# Patient Record
Sex: MALE | Race: WHITE | URBAN - METROPOLITAN AREA
[De-identification: names, ages, dates, MRNs, and addresses within clinical notes are randomized per-mention and may not be internally consistent; named-entity substitution may affect disease eponyms.]

---

## 2018-01-08 ENCOUNTER — THERAPY VISIT (OUTPATIENT)
Dept: PHYSICAL THERAPY | Facility: CLINIC | Age: 23
End: 2018-01-08
Payer: COMMERCIAL

## 2018-01-08 DIAGNOSIS — M79.671 RIGHT FOOT PAIN: Primary | ICD-10-CM

## 2018-01-08 PROCEDURE — 97110 THERAPEUTIC EXERCISES: CPT | Mod: GP | Performed by: PHYSICAL THERAPIST

## 2018-01-08 PROCEDURE — 97161 PT EVAL LOW COMPLEX 20 MIN: CPT | Mod: GP | Performed by: PHYSICAL THERAPIST

## 2018-01-08 NOTE — PROGRESS NOTES
[facility] Initial Evaluation  Subjective:  Physical Therapy Initial Evaluation   1/8/18   Precautions/Restrictions/MD instructions: PT eval and treat (Dr. Hughes, LORIE Lancaster)  Therapist Impression:   Pt is a 23 y/o male, with chorinc history of right foot pain 8  wks s/p calcaneal osteotomy with lateral column lengthening, gatroc lengthening. Pt presents with pain, decreased ROM/mobility, poor balance and decreased strength. These impairments limit their ability to walk prolonged distances. Skilled PT services necessary in order to reduce impairments and improve independent function.    Subjective:   Chief Complaint: Right foot pain, s/p calcaneal osteotomy with lateral column lengthening, gatroc lengthening.  DOI/onset: 11/22/17 DOS: 11/22/17  Location: R foot  Quality: tightness, sore, dull   Frequency: intermittent  Radiates: none  Pain scale: Rest 2/10 Activity 9/10    Sleeping: not affected    Exacerbated by: walking  Relieved by: heel cups  Progression: getting better   Previous Treatment: been in cast 2 week, boot 6 weeks  Effect of prior treatment: none   PMH and/or surgical history: Same surgery on left foot (2016)   Imaging: xray (looks good)    Occupation: unemployed currently, will be running [employer] during winter  Job duties: sitting, and foot control    Current HEP/exercise regimen: none Patient's goals: walking painfree   Medications: none  General health as reported by patient: good   Return to MD: as neede  Red Flags:  seizures    HPI                    Objective:  ANKLE:    Standing Posture: left more pes plannus than right.     Gait: Lacking push off and heel strike bilaterally (R>L)    SL Balance:   L: <10 sec (R hip drop)  R: <5 sec and painful (L hip drop)    ROM/Strength: (* indicates patient's pain)   AROM L AROM R   Dorsiflexion 3 -12   Plantarflexion 49 51   Inversion (prone) 15 5   Eversion (prone) 5 5   G Toe Ext WFL hypo   G Toe Flex WFL hypo       Edema:    General:  swelling noted on lateral side of right ankle (around malleolus)  Figure 8: L: 54cm ; R: 58cm    Palpation: tender to anterior joint line, sensitive to incision area and over achilles     Ankle/Foot Mobilizations (hyper vs hypo):   - Talocrual: hypo  - Subtalar: hypo  - Talonavicular: hypo  - Calcaneocuboid: hypo  - Cubonavicular-cuniform: hypo    System    Physical Exam    General     ROS    Assessment/Plan:    Patient is a 22 year old male with right side ankle complaints.    Patient has the following significant findings with corresponding treatment plan.                Diagnosis 1:  Right foot pain  Pain -  hot/cold therapy, electric stimulation, manual therapy, splint/taping/bracing/orthotics, self management, education and home program  Decreased ROM/flexibility - manual therapy and therapeutic exercise  Decreased joint mobility - manual therapy and therapeutic exercise  Decreased strength - therapeutic exercise and therapeutic activities  Impaired balance - neuro re-education and therapeutic activities  Decreased proprioception - neuro re-education and therapeutic activities  Inflammation - cold therapy and self management/home program  Impaired gait - gait training  Impaired muscle performance - neuro re-education  Decreased function - therapeutic activities  Impaired posture - neuro re-education  Instability -  Therapeutic Activity  Therapeutic Exercise    Therapy Evaluation Codes:   1) History comprised of:   Personal factors that impact the plan of care:      None.    Comorbidity factors that impact the plan of care are:      None.     Medications impacting care: None.  2) Examination of Body Systems comprised of:   Body structures and functions that impact the plan of care:      Ankle.   Activity limitations that impact the plan of care are:      Running, Stairs, Walking and Working.  3) Clinical presentation characteristics are:   Stable/Uncomplicated.  4) Decision-Making    Low complexity using  standardized patient assessment instrument and/or measureable assessment of functional outcome.  Cumulative Therapy Evaluation is: Low complexity.    Previous and current functional limitations:  (See Goal Flow Sheet for this information)    Short term and Long term goals: (See Goal Flow Sheet for this information)     Communication ability:  Patient appears to be able to clearly communicate and understand verbal and written communication and follow directions correctly.  Treatment Explanation - The following has been discussed with the patient:   RX ordered/plan of care  Anticipated outcomes  Possible risks and side effects  This patient would benefit from PT intervention to resume normal activities.   Rehab potential is good.    Frequency:  1 X week, once daily  Duration:  for 8 weeks  Discharge Plan:  Achieve all LTG.  Independent in home treatment program.  Reach maximal therapeutic benefit.    Please refer to the daily flowsheet for treatment today, total treatment time and time spent performing 1:1 timed codes.

## 2018-01-08 NOTE — LETTER
[facility]  [address]  [facility] [address]  [phone]    2018    Re: Darrion David   :   1995  MRN:  [id number]   REFERRING PHYSICIAN:   Telma Hughes  [facility]  Date of Initial Evaluation:  2018  Visits:  Rxs Used: 1  Reason for Referral:  Right foot pain    EVALUATION SUMMARY    [facility] Initial Evaluation  Physical Therapy Initial Evaluation   18   Precautions/Restrictions/MD instructions: PT eval and treat (Dr. Hughes, LORIE [place])  Therapist Impression:   Pt is a 21 y/o male, with chorinc history of right foot pain 8  wks s/p calcaneal osteotomy with lateral column lengthening, gatroc lengthening. Pt presents with pain, decreased ROM/mobility, poor balance and decreased strength. These impairments limit their ability to walk prolonged distances. Skilled PT services necessary in order to reduce impairments and improve independent function.  Subjective:   Chief Complaint: Right foot pain, s/p calcaneal osteotomy with lateral column lengthening, gatroc lengthening.  DOI/onset: 17 DOS: 17  Location: R foot  Quality: tightness, sore, dull   Frequency: intermittent  Radiates: none  Pain scale: Rest 2/10 Activity 9/10    Sleeping: not affected    Exacerbated by: walking  Relieved by: heel cups  Progression: getting better   Previous Treatment: been in cast 2 week, boot 6 weeks  Effect of prior treatment: none   PMH and/or surgical history: Same surgery on left foot (2016)   Imaging: xray (looks good)    Occupation: unemployed currently, will be running [employer] during winter  Job duties: sitting, and foot control    Re: Darrion David   :   1995  Current HEP/exercise regimen: none Patient's goals: walking painfree   Medications: none  General health as reported by patient: good   Return to MD: as neede  Red Flags:  seizures    Objective:  ANKLE:  Standing Posture: left  more pes plannus than right.   Gait: Lacking push off and heel strike bilaterally (R>L)  SL Balance:   L: <10 sec (R hip drop)  R: <5 sec and painful (L hip drop)  ROM/Strength: (* indicates patient's pain)   AROM L AROM R   Dorsiflexion 3 -12   Plantarflexion 49 51   Inversion (prone) 15 5   Eversion (prone) 5 5   G Toe Ext WFL hypo   G Toe Flex WFL hypo   Edema:    General: swelling noted on lateral side of right ankle (around malleolus)  Figure 8: L: 54cm ; R: 58cm  Palpation: tender to anterior joint line, sensitive to incision area and over achilles   Marti/Foot Mobilizations (hyper vs hypo):   - Talocrual: hypo  - Subtalar: hypo  - Talonavicular: hypo  - Calcaneocuboid: hypo  - Cubonavicular-cuniform: hypo  Assessment/Plan:    Patient is a 22 year old male with right side ankle complaints.    Patient has the following significant findings with corresponding treatment plan.                Diagnosis 1:  Right foot pain  Pain -  hot/cold therapy, electric stimulation, manual therapy, splint/taping/bracing/orthotics, self management, education and home program  Decreased ROM/flexibility - manual therapy and therapeutic exercise  Decreased joint mobility - manual therapy and therapeutic exercise  Decreased strength - therapeutic exercise and therapeutic activities  Impaired balance - neuro re-education and therapeutic activities  Decreased proprioception - neuro re-education and therapeutic activities  Inflammation - cold therapy and self management/home program  Impaired gait - gait training  Impaired muscle performance - neuro re-education  Decreased function - therapeutic activities  Impaired posture - neuro re-education  Instability -  Therapeutic Activity  Therapeutic Exercise  Re: Darrion David   :   1995    Therapy Evaluation Codes:   1) History comprised of:   Personal factors that impact the plan of care:      None.    Comorbidity factors that impact the plan of care are:      None.      Medications impacting care: None.  2) Examination of Body Systems comprised of:   Body structures and functions that impact the plan of care:      Ankle.   Activity limitations that impact the plan of care are:      Running, Stairs, Walking and Working.  3) Clinical presentation characteristics are:   Stable/Uncomplicated.  4) Decision-Making    Low complexity using standardized patient assessment instrument and/or measureable assessment of functional outcome.  Cumulative Therapy Evaluation is: Low complexity.  Previous and current functional limitations:  (See Goal Flow Sheet for this information)    Short term and Long term goals: (See Goal Flow Sheet for this information)   Communication ability:  Patient appears to be able to clearly communicate and understand verbal and written communication and follow directions correctly.  Treatment Explanation - The following has been discussed with the patient:   RX ordered/plan of care  Anticipated outcomes  Possible risks and side effects  This patient would benefit from PT intervention to resume normal activities.   Rehab potential is good.  Frequency:  1 X week, once daily  Duration:  for 8 weeks  Discharge Plan:  Achieve all LTG.  Independent in home treatment program.  Reach maximal therapeutic benefit.    Thank you for your referral.    INQUIRIES  Therapist: Lynnette Ames PT, DPT, [facility]   [facility]  [address]  [place] [address]  Phone: [phone]  Fax: [phone]

## 2018-01-08 NOTE — MR AVS SNAPSHOT
"              After Visit Summary   1/8/2018    Darrion David    MRN: [id number]           Patient Information     Date Of Birth          1995        Visit Information        Provider Department      1/8/2018 11:20 AM Lynnette Ames PT [facility] [place]        Today's Diagnoses     Right foot pain    -  1       Follow-ups after your visit        Your next 10 appointments already scheduled     Brayan 15, 2018 12:00 PM CST   [facility]ERRILL Extremity with Lynnette Ames PT   [facility] [place] ([facility])    [address]  [place] [id number] [phone]            Jan 22, 2018 11:20 AM CST   SHERRILL Extremity with Lynnette Ames PT   [facility] [place] ([facility] [place])    [address]  [place] [id number] [phone]              Who to contact     If you have questions or need follow up information about today's clinic visit or your schedule please contact [facility] [place] directly at [phone].  Normal or non-critical lab and imaging results will be communicated to you by First30Dayshart, letter or phone within 4 business days after the clinic has received the results. If you do not hear from us within 7 days, please contact the clinic through First30Dayshart or phone. If you have a critical or abnormal lab result, we will notify you by phone as soon as possible.  Submit refill requests through Wizpert or call your pharmacy and they will forward the refill request to us. Please allow 3 business days for your refill to be completed.          Additional Information About Your Visit        First30Dayshart Information     Wizpert lets you send messages to your doctor, view your test results, renew your prescriptions, schedule appointments and more. To sign up, go to [email or web address] . Click on \"Log in\" on the left side of the screen, which will take you to the Welcome page. Then click on \"Sign up Now\" on the right side of the page.     You " will be asked to enter the access code listed below, as well as some personal information. Please follow the directions to create your username and password.     Your access code is: [email or web address]  Expires: 2018 11:51 AM     Your access code will  in 90 days. If you need help or a new code, please call your [facility] clinic or [phone].        Care EveryWhere ID     This is your Care EveryWhere ID. This could be used by other organizations to access your [facility] medical records  [id number]         Blood Pressure from Last 3 Encounters:   No data found for BP    Weight from Last 3 Encounters:   No data found for Wt              We Performed the Following     HC PT EVAL, LOW COMPLEXITY     SHERRILL INITIAL EVAL REPORT     THERAPEUTIC EXERCISES        Primary Care Provider Fax #    Provider Not In System [phone]                Equal Access to Services     [facility]: Hadii issac Hernandez, waaxda luqadaha, qaybta kaalmada sunny, ginny adhikari . So [facility] [phone].    ATENCIÓN: Si habla español, tiene a mccabe disposición servicios gratuitos de asistencia lingüística. Liz al [phone].    We comply with applicable federal civil rights laws and [place] laws. We do not discriminate on the basis of race, color, national origin, age, disability, sex, sexual orientation, or gender identity.            Thank you!     Thank you for choosing [facility]  for your care. Our goal is always to provide you with excellent care. Hearing back from our patients is one way we can continue to improve our services. Please take a few minutes to complete the written survey that you may receive in the mail after your visit with us. Thank you!             Your Updated Medication List - Protect others around you: Learn how to safely use, store and throw away your medicines at www.disposemymeds.org.      Notice  As of 2018 11:51 AM    You have not been  prescribed any medications.

## 2018-01-15 ENCOUNTER — THERAPY VISIT (OUTPATIENT)
Dept: PHYSICAL THERAPY | Facility: CLINIC | Age: 23
End: 2018-01-15
Payer: COMMERCIAL

## 2018-01-15 DIAGNOSIS — M79.671 RIGHT FOOT PAIN: ICD-10-CM

## 2018-01-15 PROCEDURE — 97110 THERAPEUTIC EXERCISES: CPT | Mod: GP | Performed by: PHYSICAL THERAPIST

## 2018-01-15 PROCEDURE — 97140 MANUAL THERAPY 1/> REGIONS: CPT | Mod: GP | Performed by: PHYSICAL THERAPIST

## 2018-01-22 ENCOUNTER — THERAPY VISIT (OUTPATIENT)
Dept: PHYSICAL THERAPY | Facility: CLINIC | Age: 23
End: 2018-01-22
Payer: COMMERCIAL

## 2018-01-22 DIAGNOSIS — M79.671 RIGHT FOOT PAIN: ICD-10-CM

## 2018-01-22 PROCEDURE — 97140 MANUAL THERAPY 1/> REGIONS: CPT | Mod: GP | Performed by: PHYSICAL THERAPIST

## 2018-01-22 PROCEDURE — 97110 THERAPEUTIC EXERCISES: CPT | Mod: GP | Performed by: PHYSICAL THERAPIST

## 2018-01-29 ENCOUNTER — THERAPY VISIT (OUTPATIENT)
Dept: PHYSICAL THERAPY | Facility: CLINIC | Age: 23
End: 2018-01-29
Payer: COMMERCIAL

## 2018-01-29 DIAGNOSIS — M79.671 RIGHT FOOT PAIN: ICD-10-CM

## 2018-01-29 PROCEDURE — 97110 THERAPEUTIC EXERCISES: CPT | Mod: GP | Performed by: PHYSICAL THERAPIST

## 2018-01-29 PROCEDURE — 97112 NEUROMUSCULAR REEDUCATION: CPT | Mod: GP | Performed by: PHYSICAL THERAPIST

## 2018-01-29 PROCEDURE — 97140 MANUAL THERAPY 1/> REGIONS: CPT | Mod: GP | Performed by: PHYSICAL THERAPIST

## 2018-02-05 ENCOUNTER — THERAPY VISIT (OUTPATIENT)
Dept: PHYSICAL THERAPY | Facility: CLINIC | Age: 23
End: 2018-02-05
Payer: COMMERCIAL

## 2018-02-05 DIAGNOSIS — M79.671 RIGHT FOOT PAIN: ICD-10-CM

## 2018-02-05 PROCEDURE — 97140 MANUAL THERAPY 1/> REGIONS: CPT | Mod: GP | Performed by: PHYSICAL THERAPIST

## 2018-02-05 PROCEDURE — 97112 NEUROMUSCULAR REEDUCATION: CPT | Mod: GP | Performed by: PHYSICAL THERAPIST

## 2018-02-05 PROCEDURE — 97110 THERAPEUTIC EXERCISES: CPT | Mod: GP | Performed by: PHYSICAL THERAPIST

## 2018-02-14 ENCOUNTER — THERAPY VISIT (OUTPATIENT)
Dept: PHYSICAL THERAPY | Facility: CLINIC | Age: 23
End: 2018-02-14
Payer: COMMERCIAL

## 2018-02-14 DIAGNOSIS — M79.671 RIGHT FOOT PAIN: ICD-10-CM

## 2018-02-14 PROCEDURE — 97112 NEUROMUSCULAR REEDUCATION: CPT | Mod: GP | Performed by: PHYSICAL THERAPIST

## 2018-02-14 PROCEDURE — 97140 MANUAL THERAPY 1/> REGIONS: CPT | Mod: GP | Performed by: PHYSICAL THERAPIST

## 2018-02-14 PROCEDURE — 97110 THERAPEUTIC EXERCISES: CPT | Mod: GP | Performed by: PHYSICAL THERAPIST

## 2018-02-26 ENCOUNTER — THERAPY VISIT (OUTPATIENT)
Dept: PHYSICAL THERAPY | Facility: CLINIC | Age: 23
End: 2018-02-26
Payer: COMMERCIAL

## 2018-02-26 DIAGNOSIS — M79.671 RIGHT FOOT PAIN: ICD-10-CM

## 2018-02-26 PROCEDURE — 97140 MANUAL THERAPY 1/> REGIONS: CPT | Mod: GP | Performed by: PHYSICAL THERAPIST

## 2018-02-26 PROCEDURE — 97112 NEUROMUSCULAR REEDUCATION: CPT | Mod: GP | Performed by: PHYSICAL THERAPIST

## 2018-02-26 PROCEDURE — 97110 THERAPEUTIC EXERCISES: CPT | Mod: GP | Performed by: PHYSICAL THERAPIST
